# Patient Record
Sex: FEMALE | URBAN - METROPOLITAN AREA
[De-identification: names, ages, dates, MRNs, and addresses within clinical notes are randomized per-mention and may not be internally consistent; named-entity substitution may affect disease eponyms.]

---

## 2024-09-23 ENCOUNTER — APPOINTMENT (RX ONLY)
Dept: URBAN - METROPOLITAN AREA CLINIC 333 | Facility: CLINIC | Age: 64
Setting detail: DERMATOLOGY
End: 2024-09-23

## 2024-09-23 DIAGNOSIS — Z41.9 ENCOUNTER FOR PROCEDURE FOR PURPOSES OTHER THAN REMEDYING HEALTH STATE, UNSPECIFIED: ICD-10-CM

## 2024-09-23 PROCEDURE — ? MICRONEEDLING

## 2024-09-23 NOTE — PROCEDURE: MICRONEEDLING
Additional Info: Manual extractions done around nose and corners of lip and vermilion boarder
Depth In Mm (Location #2): 1
Infusions (Optional): PRP
Topical Anesthesia?: BLT cream (benzocaine 20%, lidocaine 10%, tetracaine 10%)
Consent: Verbal consent obtained, risks reviewed including but not limited to pain, scarring, infection and incomplete improvement.  Patient understands the procedure is cosmetic in nature and will require out of pocket payment.
Depth In Mm (Location #1): 0.5
# Of Treatments In Package: 0
Detail Level: Detailed
Price (Use Numbers Only, No Special Characters Or $): 271
Location #2: cheeks and chin
Post-Care Instructions: Post care instructions provided to patient.
Length Of Topical Anesthesia Application (Optional): 30 minutes
Location #1: forehead and eyes

## 2025-04-29 ENCOUNTER — APPOINTMENT (OUTPATIENT)
Dept: URBAN - METROPOLITAN AREA CLINIC 333 | Facility: CLINIC | Age: 65
Setting detail: DERMATOLOGY
End: 2025-04-29

## 2025-04-29 DIAGNOSIS — L20.89 OTHER ATOPIC DERMATITIS: ICD-10-CM | Status: INADEQUATELY CONTROLLED

## 2025-04-29 DIAGNOSIS — Z41.9 ENCOUNTER FOR PROCEDURE FOR PURPOSES OTHER THAN REMEDYING HEALTH STATE, UNSPECIFIED: ICD-10-CM

## 2025-04-29 PROCEDURE — 99214 OFFICE O/P EST MOD 30 MIN: CPT

## 2025-04-29 PROCEDURE — ? PLATELET RICH PLASMA INJECTION

## 2025-04-29 PROCEDURE — ? PRESCRIPTION MEDICATION MANAGEMENT

## 2025-04-29 PROCEDURE — ? COUNSELING

## 2025-04-29 PROCEDURE — ? PRESCRIPTION

## 2025-04-29 RX ORDER — ROFLUMILAST 3 MG/G
AS DIRECTED CREAM TOPICAL AS DIRECTED
Qty: 60 | Refills: 3 | Status: ERX | COMMUNITY
Start: 2025-04-29

## 2025-04-29 RX ADMIN — ROFLUMILAST AS DIRECTED: 3 CREAM TOPICAL at 00:00

## 2025-04-29 ASSESSMENT — LOCATION SIMPLE DESCRIPTION DERM
LOCATION SIMPLE: RIGHT CHEEK
LOCATION SIMPLE: LEFT CHEEK

## 2025-04-29 ASSESSMENT — LOCATION DETAILED DESCRIPTION DERM
LOCATION DETAILED: LEFT SUPERIOR MEDIAL MALAR CHEEK
LOCATION DETAILED: RIGHT SUPERIOR MEDIAL MALAR CHEEK

## 2025-04-29 ASSESSMENT — LOCATION ZONE DERM: LOCATION ZONE: FACE

## 2025-04-29 NOTE — PROCEDURE: PLATELET RICH PLASMA INJECTION
Topical Anesthesia Type: 20% benzocaine, 10% lidocaine, 10% tetracaine
Amount Injected At This Location In Cc (Will Not Render If 0): 0
Treatment Number (Optional): 2
Venipuncture Paragraph: An alcohol pad was applied to the venipuncture site. Venipuncture was performed using a butterfly needle. Pressure and a bandaid was applied to the site. No complications were noted.
Consent: Verbal consent obtained, risks reviewed including but not limited to pain, scarring, infection and incomplete improvement.  Patient understands the procedure is cosmetic in nature and will require out of pocket payment.
Who Performed The Venipuncture?: Carie
Location #2: cheeks and chin
Location #1: forehead, nose under eyes
Site Of Venipuncture?: left arm
Which Technique?: Default
Depth In Mm (Will Not Render If 0): 0.5
Price (Use Numbers Only, No Special Characters Or $): 429
Depth In Mm (Will Not Render If 0): 1
Post-Care Instructions: After the procedure, take precautions agains sun exposure. Do not wash treated area for 24 hrs.  After 24hrs patient may cleanse skin and apply sunscreen or make-up. After 4-5 days patients can return to their regular skin regimen.
Length Of Topical Anesthesia Application (Optional): 30 minutes
Detail Level: Detailed
Show Additional Techniques: No

## 2025-04-29 NOTE — PROCEDURE: PRESCRIPTION MEDICATION MANAGEMENT
Initiate Treatment: Zoryve 0.3 % topical cream As directed \\nSig: Apply a thin layer to the affected areas around the eyes.
Detail Level: Zone
Render In Strict Bullet Format?: No